# Patient Record
Sex: FEMALE | Race: WHITE | NOT HISPANIC OR LATINO | Employment: FULL TIME | URBAN - METROPOLITAN AREA
[De-identification: names, ages, dates, MRNs, and addresses within clinical notes are randomized per-mention and may not be internally consistent; named-entity substitution may affect disease eponyms.]

---

## 2023-05-29 DIAGNOSIS — N63.11 MASS OF UPPER OUTER QUADRANT OF RIGHT BREAST: Primary | ICD-10-CM

## 2023-05-29 PROBLEM — R92.0 MICROCALCIFICATION OF RIGHT BREAST ON MAMMOGRAM: Status: ACTIVE | Noted: 2023-05-29

## 2023-05-29 NOTE — PROGRESS NOTES
[facility]  MD Karine Sousa, NP-C    Chief Complaint:   Gilda Baxter is a 32 y.o. female presenting today for  6 month follow up. She is due for Ultrasound  She reports no interval changes on her self-breast examination.     History of Present Illness:   Mrs. Baxter first presented on October 22, 2020 due to a complex right breast mass and sonocore biopsy with aspiration showed findings consitent with an abscess. A right breast lump was felt in 2022 and was consistent with a bening fibroadenoma by Ultrasound. Close followup has veen recommended. MD::: Lisbeth Patel MD.    Past Medical History:   Diagnosis Date    Anxiety state 7/25/2016 2:55:01 PM    [facility] Historical - DO NOT USE: Anxiety-No Additional Notes    Mass of upper outer quadrant of right breast 5/29/2023    Microcalcification of right breast on mammogram 5/29/2023      Past Surgical History:   Procedure Laterality Date    APPENDECTOMY      BREAST BIOPSY      WISDOM TOOTH EXTRACTION          Current Outpatient Medications:     buPROPion (WELLBUTRIN XL) 150 MG TB24 tablet, Take 150 mg by mouth every morning., Disp: , Rfl:     dextroamphetamine-amphetamine 5 mg Tab, Take 1 tablet by mouth 2 (two) times daily., Disp: , Rfl:     FLUoxetine 20 MG tablet, , Disp: , Rfl:     norethindrone-e.estradioL-iron (BEKA 24 FE) 1 mg-20 mcg (24)/75 mg (4) per tablet, Take 1 tablet by mouth once daily., Disp: 84 tablet, Rfl: 3   Review of patient's allergies indicates:  No Known Allergies   Social History     Tobacco Use    Smoking status: Never    Smokeless tobacco: Never   Substance Use Topics    Alcohol use: Yes      Family History   Problem Relation Age of Onset    Stroke Neg Hx     Osteoporosis Neg Hx     Epilepsy Neg Hx         Review of Systems   Integumentary:  Negative for color change, rash, mole/lesion, breast mass, breast discharge and breast tenderness.   Breast: Negative for mass and  tenderness     Physical Exam   HENT:   Head: Normocephalic.   Pulmonary/Chest: Right breast exhibits mass (UOQ mass is palpable, flat, mobile and non-tender (stable)). Right breast exhibits no inverted nipple, no nipple discharge, no skin change and no tenderness. Left breast exhibits no inverted nipple, no mass, no nipple discharge, no skin change and no tenderness. No breast swelling.   Genitourinary: No breast swelling.   Musculoskeletal: Lymphadenopathy:      Upper Body:      Right upper body: No supraclavicular or axillary adenopathy.      Left upper body: No supraclavicular or axillary adenopathy.     Neurological: She is alert.      Ultrasound: The right breast cyst  at the 10 o'clock position 3 cm from the nipple has decreased in size now measuring 1.8 cm.Impression:There is no sonographic evidence of malignancy in the right breast. Recommendation: Annual mammogram is recommended beginning at age 40.       Assessment/Plan  1. Mass of upper outer quadrant of right breast  Assessment & Plan:  We reviewed our findings today and her questions were answered.  She understands that her imaging and exams have remained stable (and show nothing concerning).  She is comfortable being followed in a conservative fashion.      She understands the importance of monthly self-breast examination and knows to report any and all changes as they occur.        2. Microcalcification of right breast on mammogram  Assessment & Plan:  Same as above             Medical Decision Making:  It is my impression that this patient suffers all conditions contained in this medical document.  Each of these conditions did affect our plan of care and my medical decision making today.  It is my opinion that the medical decision making concerning this patient was of moderate difficulty based on the aforementioned conditions.  Any further recommendations will be communicated to the patient by me.  I have reviewed and verified her allergies, list of  medications, medical and surgical histories, social history, and a pertinent review of symptoms.      Follow up:  1 year and PRN    For:  PE

## 2023-06-05 ENCOUNTER — OFFICE VISIT (OUTPATIENT)
Dept: SURGERY | Facility: CLINIC | Age: 33
End: 2023-06-05
Payer: COMMERCIAL

## 2023-06-05 DIAGNOSIS — N63.11 MASS OF UPPER OUTER QUADRANT OF RIGHT BREAST: ICD-10-CM

## 2023-06-05 DIAGNOSIS — R92.0 MICROCALCIFICATION OF RIGHT BREAST ON MAMMOGRAM: ICD-10-CM

## 2023-06-05 PROCEDURE — 99213 OFFICE O/P EST LOW 20 MIN: CPT | Mod: S$GLB,,, | Performed by: NURSE PRACTITIONER

## 2023-06-05 PROCEDURE — 1159F MED LIST DOCD IN RCRD: CPT | Mod: CPTII,S$GLB,, | Performed by: NURSE PRACTITIONER

## 2023-06-05 PROCEDURE — 1160F PR REVIEW ALL MEDS BY PRESCRIBER/CLIN PHARMACIST DOCUMENTED: ICD-10-PCS | Mod: CPTII,S$GLB,, | Performed by: NURSE PRACTITIONER

## 2023-06-05 PROCEDURE — 1159F PR MEDICATION LIST DOCUMENTED IN MEDICAL RECORD: ICD-10-PCS | Mod: CPTII,S$GLB,, | Performed by: NURSE PRACTITIONER

## 2023-06-05 PROCEDURE — 99213 PR OFFICE/OUTPT VISIT, EST, LEVL III, 20-29 MIN: ICD-10-PCS | Mod: S$GLB,,, | Performed by: NURSE PRACTITIONER

## 2023-06-05 PROCEDURE — 1160F RVW MEDS BY RX/DR IN RCRD: CPT | Mod: CPTII,S$GLB,, | Performed by: NURSE PRACTITIONER

## 2023-12-13 ENCOUNTER — PATIENT MESSAGE (OUTPATIENT)
Dept: SURGERY | Facility: CLINIC | Age: 33
End: 2023-12-13
Payer: COMMERCIAL

## 2023-12-14 ENCOUNTER — OFFICE VISIT (OUTPATIENT)
Dept: SURGERY | Facility: CLINIC | Age: 33
End: 2023-12-14
Payer: COMMERCIAL

## 2023-12-14 DIAGNOSIS — N63.11 MASS OF UPPER OUTER QUADRANT OF RIGHT BREAST: Primary | ICD-10-CM

## 2023-12-14 DIAGNOSIS — R92.0 MICROCALCIFICATION OF RIGHT BREAST ON MAMMOGRAM: ICD-10-CM

## 2023-12-14 DIAGNOSIS — N60.01 BREAST CYST, RIGHT: ICD-10-CM

## 2023-12-14 PROCEDURE — 1160F PR REVIEW ALL MEDS BY PRESCRIBER/CLIN PHARMACIST DOCUMENTED: ICD-10-PCS | Mod: CPTII,S$GLB,, | Performed by: NURSE PRACTITIONER

## 2023-12-14 PROCEDURE — 99213 PR OFFICE/OUTPT VISIT, EST, LEVL III, 20-29 MIN: ICD-10-PCS | Mod: S$GLB,,, | Performed by: NURSE PRACTITIONER

## 2023-12-14 PROCEDURE — 99999 PR PBB SHADOW E&M-EST. PATIENT-LVL III: ICD-10-PCS | Mod: PBBFAC,,, | Performed by: NURSE PRACTITIONER

## 2023-12-14 PROCEDURE — 99999 PR PBB SHADOW E&M-EST. PATIENT-LVL III: CPT | Mod: PBBFAC,,, | Performed by: NURSE PRACTITIONER

## 2023-12-14 PROCEDURE — 1159F PR MEDICATION LIST DOCUMENTED IN MEDICAL RECORD: ICD-10-PCS | Mod: CPTII,S$GLB,, | Performed by: NURSE PRACTITIONER

## 2023-12-14 PROCEDURE — 1160F RVW MEDS BY RX/DR IN RCRD: CPT | Mod: CPTII,S$GLB,, | Performed by: NURSE PRACTITIONER

## 2023-12-14 PROCEDURE — 1159F MED LIST DOCD IN RCRD: CPT | Mod: CPTII,S$GLB,, | Performed by: NURSE PRACTITIONER

## 2023-12-14 PROCEDURE — 99213 OFFICE O/P EST LOW 20 MIN: CPT | Mod: S$GLB,,, | Performed by: NURSE PRACTITIONER

## 2023-12-14 NOTE — PROGRESS NOTES
[facility]  MD Karine Sousa, NP-C    Date of Service: 12/14/2023      Chief Complaint:   Gilda Baxter is a 33 y.o. female presenting today stating her right breast mass has increased in size or either  she has a new mass that she first noticed 2 days ago    History of Present Illness:   Mrs. Baxter first presented on October 22, 2020 due to a complex right breast mass and sonocore biopsy with aspiration showed findings consitent with an abscess. A right breast lump was felt in 2022 and was consistent with a bening fibroadenoma by Ultrasound. Close followup proved stability.  MD::: Lisbeth Ptael MD.     Past Medical History:   Diagnosis Date    Anxiety state 7/25/2016 2:55:01 PM    [facility] Historical - DO NOT USE: Anxiety-No Additional Notes    Breast cyst, right 12/14/2023    Mass of upper outer quadrant of right breast 05/29/2023    Microcalcification of right breast on mammogram 05/29/2023      Past Surgical History:   Procedure Laterality Date    APPENDECTOMY      BREAST BIOPSY      WISDOM TOOTH EXTRACTION          Current Outpatient Medications:     buPROPion (WELLBUTRIN XL) 150 MG TB24 tablet, Take 150 mg by mouth every morning., Disp: , Rfl:     dextroamphetamine-amphetamine 5 mg Tab, Take 1 tablet by mouth 2 (two) times daily., Disp: , Rfl:     FLUoxetine 20 MG tablet, , Disp: , Rfl:     norethindrone-e.estradioL-iron (BEKA 24 FE) 1 mg-20 mcg (24)/75 mg (4) per tablet, Take 1 tablet by mouth once daily., Disp: 84 tablet, Rfl: 3   Review of patient's allergies indicates:  No Known Allergies   Social History     Tobacco Use    Smoking status: Never    Smokeless tobacco: Never   Substance Use Topics    Alcohol use: Yes      Family History   Problem Relation Age of Onset    Stroke Neg Hx     Osteoporosis Neg Hx     Epilepsy Neg Hx     Breast cancer Neg Hx     Ovarian cancer Neg Hx         Review of Systems   Integumentary:  Positive for  breast mass. Negative for color change, rash, mole/lesion, breast discharge and breast tenderness.   Breast: Positive for mass.Negative for tenderness       Physical Exam   HENT:   Head: Normocephalic.   Pulmonary/Chest: Right breast exhibits mass (UOQ mass is palpable and non-tender (stable). Right breast exhibits no inverted nipple, no nipple discharge, no skin change and no tenderness. Left breast exhibits no inverted nipple, no mass, no nipple discharge, no skin change and no tenderness. No breast swelling.   Genitourinary: No breast swelling.   Musculoskeletal: Lymphadenopathy:      Upper Body:      Right upper body: No supraclavicular or axillary adenopathy.      Left upper body: No supraclavicular or axillary adenopathy.     Neurological: She is alert.      Ultrasound: Right Today-  Complex cystic lesion measuring 1.9 x 1 x 1.5 cm in the right breast 10 o'clock position 3 cm from the nipple.  This appears similar to dating back to 06/01/2022. Impression: Complex cystic lesion as described above.  Recommend six-month follow-up if FNA is not performed. BI-RADS Category: Overall: 3 - Probably Benign.  Right 6/5/2023- The right breast cyst  at the 10 o'clock position 3 cm from the nipple has decreased in size now measuring 1.8 cm. There is no sonographic evidence of malignancy in the right breast. Annual mammogram is recommended beginning at age 40.      Assessment/Plan  1. Mass of upper outer quadrant of right breast  Assessment & Plan:  Her mass appears stable and consistent with a complicated cyst. We discussed our FCM protocol in detail. She's comfortable being followed conservatively. She understands the importance of monthly self-breast exams and knows to notify me of any and all changes as they occur. Follow up US will be performed in 6 months as recommended.    Orders:  -     Cancel: US Breast Right Limited; Future; Expected date: 12/14/2023    2. Microcalcification of right breast on  mammogram  Assessment & Plan:  Her exams have remained stable.      3. Breast cyst, right  Assessment & Plan:  We discussed our Fibrocystic Mastopathy Protocol in detail. She should take Vitamin E 800 IU everyday x 3 months or until non-tender then can stop Vitamin E vs. continue daily at 400 IU.  The use of ice packs or warms soaks to tender area of the breast may also be of some benefit.  If warm soaks help her tenderness - She can use Aspercreme (unless allergic to Aspirin) on the affected area.  Ibuprofen (if no contraindications) at 800 mg three times per day for 5 days can also relieve many symptoms associated with swollen or inflamed tissue.  She can repeat Ibuprofen for 5 days, but then should be off for 5 days as it may cause gastric upset.  It is a good idea to wear a tight bra during the day and night to minimize movement of the tender area (Sports Bras work well).  Evening Primrose Oil can be bought over the counter and used at a dose of 3000 mg per day to help with any breast pain/tenderness not improved by implementing the above measures.               Medical Decision Making:  It is my impression that this patient suffers all conditions contained in this medical document.  Each of these conditions did affect our plan of care and my medical decision making today.  It is my opinion that the medical decision making concerning this patient was of moderate difficulty based on the aforementioned conditions.  Any further recommendations will be communicated to the patient by me.  I have reviewed and verified her allergies, list of medications, medical and surgical histories, social history, and a pertinent review of symptoms.      Follow up:  6 months and prn    For:  US (D) le aoc right at [facility]

## 2023-12-14 NOTE — ASSESSMENT & PLAN NOTE
Her mass appears stable and consistent with a complicated cyst. We discussed our FCM protocol in detail. She's comfortable being followed conservatively. She understands the importance of monthly self-breast exams and knows to notify me of any and all changes as they occur. Follow up US will be performed in 6 months as recommended.